# Patient Record
Sex: MALE | Race: WHITE | Employment: FULL TIME | URBAN - METROPOLITAN AREA
[De-identification: names, ages, dates, MRNs, and addresses within clinical notes are randomized per-mention and may not be internally consistent; named-entity substitution may affect disease eponyms.]

---

## 2017-05-23 ENCOUNTER — OFFICE VISIT (OUTPATIENT)
Dept: PEDIATRICS | Facility: CLINIC | Age: 42
End: 2017-05-23
Payer: COMMERCIAL

## 2017-05-23 VITALS
HEIGHT: 71 IN | TEMPERATURE: 98 F | SYSTOLIC BLOOD PRESSURE: 122 MMHG | BODY MASS INDEX: 30.66 KG/M2 | DIASTOLIC BLOOD PRESSURE: 70 MMHG | OXYGEN SATURATION: 96 % | WEIGHT: 219 LBS | HEART RATE: 73 BPM

## 2017-05-23 DIAGNOSIS — G89.29 CHRONIC LEFT SHOULDER PAIN: ICD-10-CM

## 2017-05-23 DIAGNOSIS — Z00.00 ROUTINE GENERAL MEDICAL EXAMINATION AT A HEALTH CARE FACILITY: Primary | ICD-10-CM

## 2017-05-23 DIAGNOSIS — M25.512 CHRONIC LEFT SHOULDER PAIN: ICD-10-CM

## 2017-05-23 PROCEDURE — 36415 COLL VENOUS BLD VENIPUNCTURE: CPT | Performed by: INTERNAL MEDICINE

## 2017-05-23 PROCEDURE — 80053 COMPREHEN METABOLIC PANEL: CPT | Performed by: INTERNAL MEDICINE

## 2017-05-23 PROCEDURE — 99386 PREV VISIT NEW AGE 40-64: CPT | Performed by: INTERNAL MEDICINE

## 2017-05-23 PROCEDURE — 80061 LIPID PANEL: CPT | Performed by: INTERNAL MEDICINE

## 2017-05-23 NOTE — NURSING NOTE
"Chief Complaint   Patient presents with     Physical       Initial /70 (BP Location: Right arm, Cuff Size: Adult Regular)  Pulse 73  Temp 98  F (36.7  C) (Oral)  Ht 5' 11\" (1.803 m)  Wt 219 lb (99.3 kg)  SpO2 96%  BMI 30.54 kg/m2 Estimated body mass index is 30.54 kg/(m^2) as calculated from the following:    Height as of this encounter: 5' 11\" (1.803 m).    Weight as of this encounter: 219 lb (99.3 kg).  Medication Reconciliation: complete   Em De Leon MA    "

## 2017-05-23 NOTE — PATIENT INSTRUCTIONS
1) Physical therapy for the left neck and shoulder area, if this is not helping, we can consider getting imaging of the spine area    2) Labs downstairs today looking at cholesterol, liver and kidney functions    Dmitri Akhtar MD    Preventive Health Recommendations  Male Ages 40 to 49    Yearly exam:             See your health care provider every year in order to  o   Review health changes.   o   Discuss preventive care.    o   Review your medicines if your doctor has prescribed any.    You should be tested each year for STDs (sexually transmitted diseases) if you re at risk.     Have a cholesterol test every 5 years.     Have a colonoscopy (test for colon cancer) if someone in your family has had colon cancer or polyps before age 50.     After age 45, have a diabetes test (fasting glucose). If you are at risk for diabetes, you should have this test every 3 years.      Talk with your health care provider about whether or not a prostate cancer screening test (PSA) is right for you.    Shots: Get a flu shot each year. Get a tetanus shot every 10 years.     Nutrition:    Eat at least 5 servings of fruits and vegetables daily.     Eat whole-grain bread, whole-wheat pasta and brown rice instead of white grains and rice.     Talk to your provider about Calcium and Vitamin D.     Lifestyle    Exercise for at least 150 minutes a week (30 minutes a day, 5 days a week). This will help you control your weight and prevent disease.     Limit alcohol to one drink per day.     No smoking.     Wear sunscreen to prevent skin cancer.     See your dentist every six months for an exam and cleaning.

## 2017-05-23 NOTE — LETTER
[facility]  [address] [place] [address]  [place] [address] [phone]   May 26, 2017    Jp Burch  [address]  [place] [address]      Dear Jp,     Here are the results from the recent Labs that we did.     All of your labs looked great.     Let me know if you have questions or concerns!     Sincerely,       Dmitri Akhtar MD   Internal Medicine and Pediatrics         Results for orders placed or performed in visit on 05/23/17   Lipid panel reflex to direct LDL   Result Value Ref Range    Cholesterol 182 <200 mg/dL    Triglycerides 153 (H) <150 mg/dL    HDL Cholesterol 55 >39 mg/dL    LDL Cholesterol Calculated 96 <100 mg/dL    Non HDL Cholesterol 127 <130 mg/dL   Comprehensive metabolic panel   Result Value Ref Range    Sodium 139 133 - 144 mmol/L    Potassium 4.3 3.4 - 5.3 mmol/L    Chloride 105 94 - 109 mmol/L    Carbon Dioxide 25 20 - 32 mmol/L    Anion Gap 9 3 - 14 mmol/L    Glucose 79 70 - 99 mg/dL    Urea Nitrogen 19 7 - 30 mg/dL    Creatinine 1.10 0.66 - 1.25 mg/dL    GFR Estimate 74 >60 mL/min/1.7m2    GFR Estimate If Black 89 >60 mL/min/1.7m2    Calcium 9.1 8.5 - 10.1 mg/dL    Bilirubin Total 0.5 0.2 - 1.3 mg/dL    Albumin 4.0 3.4 - 5.0 g/dL    Protein Total 7.8 6.8 - 8.8 g/dL    Alkaline Phosphatase 72 40 - 150 U/L    ALT 34 0 - 70 U/L    AST 20 0 - 45 U/L

## 2017-05-23 NOTE — PROGRESS NOTES
SUBJECTIVE:     CC: Jp Burch is an 41 year old male who presents for preventative health visit.     Physical   Annual:     Getting at least 3 servings of Calcium per day::  NO    Bi-annual eye exam::  NO    Dental care twice a year::  NO    Sleep apnea or symptoms of sleep apnea::  None    Diet::  Regular (no restrictions)    Frequency of exercise::  None    Taking medications regularly::  Yes    Medication side effects::  None    Additional concerns today::  YES    Left shoulder: has been having issues for the last 7 year. Gets some pain and numbness with weakness across chest and neck area, occurs daily. Occurs anywhere from 2 minutes to 2 hours.   Did cardiology evaluation and negative before. Injured shoulder at age 18- did not have checked out at that time. Has been times feels that physical reach will make worse.  Had issues with lower           Today's PHQ-2 Score:   PHQ-2 ( 1999 Pfizer) 5/23/2017   Little interest or pleasure in doing things Not at all   Feeling down, depressed or hopeless Not at all   PHQ-2 Score 0       Abuse: Current or Past(Physical, Sexual or Emotional)- No  Do you feel safe in your environment - Yes    Social History   Substance Use Topics     Smoking status: Never Smoker     Smokeless tobacco: Not on file     Alcohol use Yes      Comment: 1 drink per month     The patient does not drink >3 drinks per day nor >7 drinks per week.    Last PSA: No results found for: PSA    No results for input(s): CHOL, HDL, LDL, TRIG, CHOLHDLRATIO, NHDL in the last 97522 hours.    Reviewed orders with patient. Reviewed health maintenance and updated orders accordingly - Yes    Reviewed and updated as needed this visit by clinical staff  Tobacco  Allergies  Med Hx  Surg Hx  Fam Hx  Soc Hx        Reviewed and updated as needed this visit by Provider            ROS:  C: NEGATIVE for fever, chills, change in weight  I: NEGATIVE for worrisome rashes, moles or lesions  E: NEGATIVE for vision  "changes or irritation  ENT: NEGATIVE for ear, mouth and throat problems  R: NEGATIVE for significant cough or SOB  CV: NEGATIVE for chest pain, palpitations or peripheral edema  GI: NEGATIVE for nausea, abdominal pain, heartburn, or change in bowel habits   male: negative for dysuria, hematuria, decreased urinary stream, erectile dysfunction, urethral discharge  M: NEGATIVE for significant arthralgias or myalgia  N: NEGATIVE for weakness, dizziness or paresthesias  P: NEGATIVE for changes in mood or affect    Problem list, Medication list, Allergies, and Medical/Social/Surgical histories reviewed in EPIC and updated as appropriate.  OBJECTIVE:     /70 (BP Location: Right arm, Cuff Size: Adult Regular)  Pulse 73  Temp 98  F (36.7  C) (Oral)  Ht 5' 11\" (1.803 m)  Wt 219 lb (99.3 kg)  SpO2 96%  BMI 30.54 kg/m2  EXAM:  GENERAL: healthy, alert and no distress  EYES: Eyes grossly normal to inspection, PERRL and conjunctivae and sclerae normal  HENT: ear canals and TM's normal, nose and mouth without ulcers or lesions  NECK: no adenopathy, no asymmetry, masses, or scars and thyroid normal to palpation  RESP: lungs clear to auscultation - no rales, rhonchi or wheezes  CV: regular rate and rhythm, normal S1 S2, no S3 or S4, no murmur, click or rub, no peripheral edema and peripheral pulses strong  ABDOMEN: soft, nontender, no hepatosplenomegaly, no masses and bowel sounds normal  MS: no gross musculoskeletal defects noted, no edema  MS: mild tenderness along the left trapezoid area, otherwise normal internal and external rotation and ROM of the left shoulder area  SKIN: no suspicious lesions or rashes  NEURO: Normal strength and tone, mentation intact and speech normal  PSYCH: mentation appears normal, affect normal/bright    ASSESSMENT/PLAN:     1. Routine general medical examination at a health care facility  Discussed routine health screeing  - Lipid panel reflex to direct LDL  - Comprehensive metabolic " "panel    2. Chronic left shoulder pain  Has had cardiology evaluation, seems less likely complex regional pain syndrome, would consider MRI of the cervical thoracic spine if having ongoing issues or worsening symptosm  - [facility] HAND, AND CHIROPRACTIC REFERRAL    COUNSELING:   Reviewed preventive health counseling, as reflected in patient instructions         reports that he has never smoked. He does not have any smokeless tobacco history on file.    Estimated body mass index is 28.73 kg/(m^2) as calculated from the following:    Height as of 8/3/09: 5' 11\" (1.803 m).    Weight as of 8/3/09: 206 lb (93.4 kg).   Weight management plan: Discussed healthy diet and exercise guidelines and patient will follow up in 12 months in clinic to re-evaluate.    Counseling Resources:  ATP IV Guidelines  Pooled Cohorts Equation Calculator  FRAX Risk Assessment  ICSI Preventive Guidelines  Dietary Guidelines for Americans, 2010  USDA's MyPlate  ASA Prophylaxis  Lung CA Screening    Dmitri Akhtar MD, MD  [facility] [place]  "

## 2017-05-23 NOTE — MR AVS SNAPSHOT
After Visit Summary   5/23/2017    Jp Burch    MRN: [id number]           Patient Information     Date Of Birth          1975        Visit Information        Provider Department      5/23/2017 4:00 PM Dmitri Akhtar MD [facility]        Today's Diagnoses     Routine general medical examination at a health care facility    -  1    Chronic left shoulder pain          Care Instructions    1) Physical therapy for the left neck and shoulder area, if this is not helping, we can consider getting imaging of the spine area    2) Labs downstairs today looking at cholesterol, liver and kidney functions    Dmitri Akhtar MD    Preventive Health Recommendations  Male Ages 40 to 49    Yearly exam:             See your health care provider every year in order to  o   Review health changes.   o   Discuss preventive care.    o   Review your medicines if your doctor has prescribed any.    You should be tested each year for STDs (sexually transmitted diseases) if you re at risk.     Have a cholesterol test every 5 years.     Have a colonoscopy (test for colon cancer) if someone in your family has had colon cancer or polyps before age 50.     After age 45, have a diabetes test (fasting glucose). If you are at risk for diabetes, you should have this test every 3 years.      Talk with your health care provider about whether or not a prostate cancer screening test (PSA) is right for you.    Shots: Get a flu shot each year. Get a tetanus shot every 10 years.     Nutrition:    Eat at least 5 servings of fruits and vegetables daily.     Eat whole-grain bread, whole-wheat pasta and brown rice instead of white grains and rice.     Talk to your provider about Calcium and Vitamin D.     Lifestyle    Exercise for at least 150 minutes a week (30 minutes a day, 5 days a week). This will help you control your weight and prevent disease.     Limit alcohol to one drink per day.     No smoking.     Wear sunscreen to  prevent skin cancer.     See your dentist every six months for an exam and cleaning.            Follow-ups after your visit        Additional Services     [facility] HAND, AND CH[facility]RACTIC REFERRAL       **This order will print in the [facility] Scheduling Office**    Physical Therapy, Hand Therapy and Chiropractic Care are available through:    *[facility]  *[facility]  *[facility]    Call one number to schedule at any of the above locations: [phone].    Your provider has referred you to: [facility] - PT and/or Chiropractic Care determined by clinical presentation at [facility] or [facility] Initial Visit    Indication/Reason for Referral: neck pain with radiation down the left arm, has been present for 7 years  Onset of Illness: 7 years  Therapy Orders: Evaluate and Treat  Special Programs: None  Special Request: None      Brian Torres      Additional Comments for the Therapist or Chiropractor:     Please be aware that coverage of these services is subject to the terms and limitations of your health insurance plan.  Call member services at your health plan with any benefit or coverage questions.      Please bring the following to your appointment:    *Your personal calendar for scheduling future appointments  *Comfortable clothing                  Who to contact     If you have questions or need follow up information about today's clinic visit or your schedule please contact [facility] directly at [phone].  Normal or non-critical lab and imaging results will be communicated to you by MyChart, letter or phone within 4 business days after the clinic has received the results. If you do not hear from us within 7 days, please contact the clinic through MyChart or phone. If you have a critical or abnormal lab result, we will notify you by phone as soon as possible.  Submit refill requests through [employer] or call your pharmacy and they will forward  "the refill request to us. Please allow 3 business days for your refill to be completed.          Additional Information About Your Visit        MyChart Information     [employer] lets you send messages to your doctor, view your test results, renew your prescriptions, schedule appointments and more. To sign up, go to [email or web address] . Click on \"Log in\" on the left side of the screen, which will take you to the Welcome page. Then click on \"Sign up Now\" on the right side of the page.     You will be asked to enter the access code listed below, as well as some personal information. Please follow the directions to create your username and password.     Your access code is: [email or web address]  Expires: 8/15/2017 11:32 AM     Your access code will  in 90 days. If you need help or a new code, please call your [place] clinic or [phone].        Care EveryWhere ID     This is your Care EveryWhere ID. This could be used by other organizations to access your [place] medical records  [id number]        Your Vitals Were     Pulse Temperature Height Pulse Oximetry BMI (Body Mass Index)       73 98  F (36.7  C) (Oral) 5' 11\" (1.803 m) 96% 30.54 kg/m2        Blood Pressure from Last 3 Encounters:   17 122/70   09 135/75   09 158/70    Weight from Last 3 Encounters:   17 219 lb (99.3 kg)   09 206 lb (93.4 kg)   09 225 lb (102.1 kg)              We Performed the Following     Comprehensive metabolic panel     [facility] HAND, AND CHIROPRACTIC REFERRAL     Lipid panel reflex to direct LDL        Primary Care Provider Office Phone # Fax #    Hillary Markham [phone]       [facility] [address] [place] [address]        Thank you!     Thank you for choosing [facility]  for your care. Our goal is always to provide you with excellent care. Hearing back from our patients is one way we can continue to improve our services. Please take " a few minutes to complete the written survey that you may receive in the mail after your visit with us. Thank you!             Your Updated Medication List - Protect others around you: Learn how to safely use, store and throw away your medicines at www.disposemymeds.org.      Notice  As of 5/23/2017  4:26 PM    You have not been prescribed any medications.

## 2017-05-25 LAB
ALBUMIN SERPL-MCNC: 4 G/DL (ref 3.4–5)
ALP SERPL-CCNC: 72 U/L (ref 40–150)
ALT SERPL W P-5'-P-CCNC: 34 U/L (ref 0–70)
ANION GAP SERPL CALCULATED.3IONS-SCNC: 9 MMOL/L (ref 3–14)
AST SERPL W P-5'-P-CCNC: 20 U/L (ref 0–45)
BILIRUB SERPL-MCNC: 0.5 MG/DL (ref 0.2–1.3)
BUN SERPL-MCNC: 19 MG/DL (ref 7–30)
CALCIUM SERPL-MCNC: 9.1 MG/DL (ref 8.5–10.1)
CHLORIDE SERPL-SCNC: 105 MMOL/L (ref 94–109)
CHOLEST SERPL-MCNC: 182 MG/DL
CO2 SERPL-SCNC: 25 MMOL/L (ref 20–32)
CREAT SERPL-MCNC: 1.1 MG/DL (ref 0.66–1.25)
GFR SERPL CREATININE-BSD FRML MDRD: 74 ML/MIN/1.7M2
GLUCOSE SERPL-MCNC: 79 MG/DL (ref 70–99)
HDLC SERPL-MCNC: 55 MG/DL
LDLC SERPL CALC-MCNC: 96 MG/DL
NONHDLC SERPL-MCNC: 127 MG/DL
POTASSIUM SERPL-SCNC: 4.3 MMOL/L (ref 3.4–5.3)
PROT SERPL-MCNC: 7.8 G/DL (ref 6.8–8.8)
SODIUM SERPL-SCNC: 139 MMOL/L (ref 133–144)
TRIGL SERPL-MCNC: 153 MG/DL

## 2018-10-26 ENCOUNTER — OFFICE VISIT (OUTPATIENT)
Dept: URGENT CARE | Facility: URGENT CARE | Age: 43
End: 2018-10-26
Payer: COMMERCIAL

## 2018-10-26 VITALS
OXYGEN SATURATION: 98 % | WEIGHT: 203.4 LBS | DIASTOLIC BLOOD PRESSURE: 76 MMHG | SYSTOLIC BLOOD PRESSURE: 102 MMHG | BODY MASS INDEX: 28.37 KG/M2 | HEART RATE: 79 BPM | TEMPERATURE: 98.5 F

## 2018-10-26 DIAGNOSIS — H00.032 CELLULITIS OF RIGHT LOWER EYELID: Primary | ICD-10-CM

## 2018-10-26 PROCEDURE — 99213 OFFICE O/P EST LOW 20 MIN: CPT | Performed by: FAMILY MEDICINE

## 2018-10-26 RX ORDER — CEPHALEXIN 500 MG/1
500 CAPSULE ORAL 3 TIMES DAILY
Qty: 21 CAPSULE | Refills: 0 | Status: SHIPPED | OUTPATIENT
Start: 2018-10-26 | End: 2018-11-02

## 2018-10-26 NOTE — NURSING NOTE
VISION   No corrective lenses  Tool used: Aidan   Right eye:        10/10 (20/20)  Left eye:          10/12.5 (20/25)  Fartun Florez MA

## 2018-10-26 NOTE — MR AVS SNAPSHOT
"              After Visit Summary   10/26/2018    Jp Burch    MRN: [id number]           Patient Information     Date Of Birth          1975        Visit Information        Provider Department      10/26/2018 6:20 PM Jay Jay Rocha MD [facility]        Today's Diagnoses     Cellulitis of right lower eyelid    -  1      Care Instructions    Place warmth onto the swollen, tender right lower eyelid for 15 minutes at a time, every 2-3 hours while awake.      follow up with a primary care provider if not better in 7-10 days.  Sooner if the redness keeps spreading despite two days of the antibiotic therapy or if fevers appear.                Follow-ups after your visit        Who to contact     If you have questions or need follow up information about today's clinic visit or your schedule please contact [facility] directly at [phone].  Normal or non-critical lab and imaging results will be communicated to you by [employer], letter or phone within 4 business days after the clinic has received the results. If you do not hear from us within 7 days, please contact the clinic through [employer] or phone. If you have a critical or abnormal lab result, we will notify you by phone as soon as possible.  Submit refill requests through [employer] or call your pharmacy and they will forward the refill request to us. Please allow 3 business days for your refill to be completed.          Additional Information About Your Visit        MyChart Information     [employer] lets you send messages to your doctor, view your test results, renew your prescriptions, schedule appointments and more. To sign up, go to [email or web address] . Click on \"Log in\" on the left side of the screen, which will take you to the Welcome page. Then click on \"Sign up Now\" on the right side of the page.     You will be asked to enter the access code listed below, as well as some personal information. Please follow the directions " to create your username and password.     Your access code is: [email or web address]  Expires: 2019  6:45 PM     Your access code will  in 90 days. If you need help or a new code, please call your [place] clinic or [phone].        Care EveryWhere ID     This is your Care EveryWhere ID. This could be used by other organizations to access your [place] medical records  [id number]        Your Vitals Were     Pulse Temperature Pulse Oximetry BMI (Body Mass Index)          79 98.5  F (36.9  C) (Oral) 98% 28.37 kg/m2         Blood Pressure from Last 3 Encounters:   10/26/18 102/76   17 122/70   09 135/75    Weight from Last 3 Encounters:   10/26/18 203 lb 6.4 oz (92.3 kg)   17 219 lb (99.3 kg)   09 206 lb (93.4 kg)              Today, you had the following     No orders found for display         Today's Medication Changes          These changes are accurate as of 10/26/18  6:45 PM.  If you have any questions, ask your nurse or doctor.               Start taking these medicines.        Dose/Directions    cephALEXin 500 MG capsule   Commonly known as:  KEFLEX   Used for:  Cellulitis of right lower eyelid   Started by:  Jay [place] Rocha MD        Dose:  500 mg   Take 1 capsule (500 mg) by mouth 3 times daily for 7 days   Quantity:  21 capsule   Refills:  0            Where to get your medicines      Some of these will need a paper prescription and others can be bought over the counter.  Ask your nurse if you have questions.     Bring a paper prescription for each of these medications     cephALEXin 500 MG capsule                Primary Care Provider Office Phone # Fax #    Dmitri Akhtar [phone] [address] [place] [address]        Equal Access to Services     [facility] : Derrick Plascencia, izzy cross, qaybta kapankaj dunlap. So [facility] [place] [phone].    ATENCIÓN: hilton Roach  berkowitz disposición servicios gratuitos de asistencia lingüística. Elba murrell [phone].    We comply with applicable federal civil rights laws and [place] laws. We do not discriminate on the basis of race, color, national origin, age, disability, sex, sexual orientation, or gender identity.            Thank you!     Thank you for choosing [facility]  for your care. Our goal is always to provide you with excellent care. Hearing back from our patients is one way we can continue to improve our services. Please take a few minutes to complete the written survey that you may receive in the mail after your visit with us. Thank you!             Your Updated Medication List - Protect others around you: Learn how to safely use, store and throw away your medicines at www.disposemymeds.org.          This list is accurate as of 10/26/18  6:45 PM.  Always use your most recent med list.                   Brand Name Dispense Instructions for use Diagnosis    cephALEXin 500 MG capsule    KEFLEX    21 capsule    Take 1 capsule (500 mg) by mouth 3 times daily for 7 days    Cellulitis of right lower eyelid       IBUPROFEN PO

## 2018-10-26 NOTE — PROGRESS NOTES
SUBJECTIVE:  Chief Complaint:   Chief Complaint   Patient presents with     Urgent Care     Eye Problem     start today at 1pm sx right eye, some swelling, rubbing eyes, no discomfort or pain, sensitive, no vision changes, hx of sinus infection in last few days, hx of lasik surgery 15 years ago  tx none       History of Present Illness:  Jp Burch is a 43 year old male who presents complaining of worsening pain and swelling at the right lower eyelid margin since 1 pm today..   No pus/discharge/bleeding.    Contact wearer :  None.     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    OBJECTIVE:  Visual Acuity:  right eye 20/20; left eye 20/25  /76 (BP Location: Right arm, Patient Position: Chair, Cuff Size: Adult Large)  Pulse 79  Temp 98.5  F (36.9  C) (Oral)  Wt 203 lb 6.4 oz (92.3 kg)  SpO2 98%  BMI 28.37 kg/m2  General: no acute distress  Eye exam: Right eye abnormalities:  The lower eyelid has increased edema, confluent erythema and tenderness.  No obvious stye.  The conjunctiva was within normal limits.  No discharge.  extraocular movements intact.     ASSESSMENT:  Cellulitis of the right lower eyelid.      PLAN:  Rx:  Cephalexin See orders in epic  Warm  follow up with the primary care provider if not better in 7-10 days. Sooner if redness keeps spreading despite two days of antibiotic therapy or if fevers appear.     Jay [place] Rocha MD

## 2018-10-26 NOTE — PATIENT INSTRUCTIONS
Place warmth onto the swollen, tender right lower eyelid for 15 minutes at a time, every 2-3 hours while awake.      follow up with a primary care provider if not better in 7-10 days.  Sooner if the redness keeps spreading despite two days of the antibiotic therapy or if fevers appear.

## 2019-05-08 ENCOUNTER — COMMUNICATION - HEALTHEAST (OUTPATIENT)
Dept: SCHEDULING | Facility: CLINIC | Age: 44
End: 2019-05-08

## 2019-05-09 ENCOUNTER — OFFICE VISIT (OUTPATIENT)
Dept: URGENT CARE | Facility: URGENT CARE | Age: 44
End: 2019-05-09
Payer: COMMERCIAL

## 2019-05-09 VITALS
HEART RATE: 71 BPM | WEIGHT: 221 LBS | TEMPERATURE: 98.2 F | BODY MASS INDEX: 30.82 KG/M2 | OXYGEN SATURATION: 97 % | SYSTOLIC BLOOD PRESSURE: 122 MMHG | DIASTOLIC BLOOD PRESSURE: 80 MMHG

## 2019-05-09 DIAGNOSIS — H92.01 DISCOMFORT OF RIGHT EAR: Primary | ICD-10-CM

## 2019-05-09 PROCEDURE — 99213 OFFICE O/P EST LOW 20 MIN: CPT | Performed by: FAMILY MEDICINE

## 2019-05-09 NOTE — PROGRESS NOTES
Subjective:   Jp Burch is a 43 year old male who presents for   Chief Complaint   Patient presents with     Urgent Care     Ear Problem     right ear pain since yesterday- root canal today today right upper side        Apparently had crown on right side placed 2 months ago of the upper molar - concerns about decay being too much. Patient had root canal today around 1045 - pain really came on around 4pm. Has not called the endodontist. Tooth pain no longer there  Ibuprofen not helping with discomfort - he is taking about 400mg every 6 hours.   R ear is discomforting. No ear ringing. No ear discharge. Feels a pressure/discomfort. No facial swelling.   Denies fevers.        Patient Active Problem List    Diagnosis Date Noted     CARDIOVASCULAR SCREENING; LDL GOAL LESS THAN 160 10/31/2010     Priority: Medium     Vitamin D deficiency      Priority: Medium     vitD2/D3 of 23       Generalized anxiety disorder 05/08/2009     Priority: Medium     Diagnosis updated by automated process. Provider to review and confirm.       Carpal tunnel syndrome 02/27/2008     Priority: Medium     Esophageal reflux 02/27/2008     Priority: Medium     Herpes simplex virus (HSV) infection 02/13/2007     Priority: Medium     Since childhood    Problem list name updated by automated process. Provider to review       Obesity 10/03/2006     Priority: Medium     Problem list name updated by automated process. Provider to review         Current Outpatient Medications   Medication     IBUPROFEN PO     No current facility-administered medications for this visit.      ROS:  As above per HPI     Objective:   /80 (BP Location: Right arm)   Pulse 71   Temp 98.2  F (36.8  C) (Tympanic)   Wt 100.2 kg (221 lb)   SpO2 97%   BMI 30.82 kg/m  , Body mass index is 30.82 kg/m .  Gen:  NAD, well-nourished, sitting in chair comfortably  HEENT: EOMI, sclera anicteric, Head normocephalic, ; nares patent; moist mucous membranes, normal left and  right TM. No pain with pulling on outer ears. Normal ear canals. No gum swelling of right upper jaw, uvula midline, no trismus  Neck: trachea midline, no thyromegaly  CV:  Hemodynamically stable  Pulm:  no increased work of breathing   Extrem: no cyanosis, edema or clubbing  Skin: no obvious rashes or abnormalities  Psych: Euthymic, linear thoughts, normal rate of speech      Assessment & Plan:   Jp ROSAURA Burch, 43 year old male who presents with:  Discomfort of right ear  Patient wanted reassurance there was no right ear infection. Appears normal on exam without bulging or retraction. No perforation. Normal ear canal. Tylenol and ibuprofen recommended for pain.       Mychal Cunningham MD   [facility]    The use of Dragon/Wonderflow dictation services may have been used to construct the content in this note; any grammatical or spelling errors are non-intentional. Please contact the author of this note directly if you are in need of any clarification.

## 2019-05-09 NOTE — PATIENT INSTRUCTIONS
Tylenol 650mg every 4-6 hours    Ibuprofen 600-800mg every 6 hours    Heat or cold packs, whichever feels better      If the pain gets worse please call us or your dentist's office      If you develop fever please call immediately

## 2019-10-10 ENCOUNTER — OFFICE VISIT (OUTPATIENT)
Dept: OPTOMETRY | Facility: CLINIC | Age: 44
End: 2019-10-10
Payer: COMMERCIAL

## 2019-10-10 DIAGNOSIS — H52.4 PRESBYOPIA: ICD-10-CM

## 2019-10-10 DIAGNOSIS — H52.13 MYOPIA OF BOTH EYES: Primary | ICD-10-CM

## 2019-10-10 PROCEDURE — 92004 COMPRE OPH EXAM NEW PT 1/>: CPT | Performed by: OPTOMETRIST

## 2019-10-10 ASSESSMENT — CUP TO DISC RATIO
OD_RATIO: 0.25
OS_RATIO: 0.35

## 2019-10-10 ASSESSMENT — REFRACTION_MANIFEST
OD_AXIS: 006
OS_CYLINDER: +0.25
METHOD_AUTOREFRACTION: 1
OD_SPHERE: -0.75
OS_SPHERE: -1.00
OD_SPHERE: -1.00
OD_CYLINDER: SPHERE
OS_CYLINDER: SPHERE
OD_CYLINDER: +0.50
OS_AXIS: 161
OS_SPHERE: -1.25

## 2019-10-10 ASSESSMENT — VISUAL ACUITY
OD_SC+: -2
OD_SC: 20/30+3
OS_SC: 20/20-2
OS_SC+: -1
METHOD: SNELLEN - LINEAR
OD_SC: 20/25
OS_SC: 20/30

## 2019-10-10 ASSESSMENT — TONOMETRY
OS_IOP_MMHG: 15
OD_IOP_MMHG: 15
IOP_METHOD: APPLANATION

## 2019-10-10 ASSESSMENT — EXTERNAL EXAM - LEFT EYE: OS_EXAM: NORMAL

## 2019-10-10 ASSESSMENT — SLIT LAMP EXAM - LIDS
COMMENTS: NORMAL
COMMENTS: NORMAL

## 2019-10-10 ASSESSMENT — CONF VISUAL FIELD
OD_NORMAL: 1
OS_NORMAL: 1
METHOD: COUNTING FINGERS

## 2019-10-10 ASSESSMENT — EXTERNAL EXAM - RIGHT EYE: OD_EXAM: NORMAL

## 2019-10-10 NOTE — PROGRESS NOTES
Chief Complaint   Patient presents with     Annual Eye Exam         Last Eye Exam: 2002, LASIK Dr Braswell  Dilated Previously: Yes    What are you currently using to see?  does not use glasses or contacts       Distance Vision Acuity: Noticed gradual change in both eyes and Noticed sudden change in both eyes    Near Vision Acuity: Satisfied with vision while reading and using computer unaided    Eye Comfort: good  Do you use eye drops? : No  Occupation or Hobbies:  - computers a lot.     Vivian Chance CPO          Medical, surgical and family histories reviewed and updated 10/10/2019.       OBJECTIVE: See Ophthalmology exam    ASSESSMENT:    ICD-10-CM    1. Myopia of both eyes H52.13    2. Presbyopia H52.4       PLAN:   Distance only prescription   Yaritza Singer OD

## 2019-10-10 NOTE — LETTER
10/10/2019         RE: Jp Burch  [address] [id number]        Dear Colleague,    Thank you for referring your patient, Jp Burch, to the [facility]. Please see a copy of my visit note below.    Chief Complaint   Patient presents with     Annual Eye Exam         Last Eye Exam: 2002, LASIK Dr Braswell  Dilated Previously: Yes    What are you currently using to see?  does not use glasses or contacts       Distance Vision Acuity: Noticed gradual change in both eyes and Noticed sudden change in both eyes    Near Vision Acuity: Satisfied with vision while reading and using computer unaided    Eye Comfort: good  Do you use eye drops? : No  Occupation or Hobbies:  - computers a lot.     Vivian Chance CPO          Medical, surgical and family histories reviewed and updated 10/10/2019.       OBJECTIVE: See Ophthalmology exam    ASSESSMENT:    ICD-10-CM    1. Myopia of both eyes H52.13    2. Presbyopia H52.4       PLAN:   Distance only prescription   Yaritza Singer OD     Again, thank you for allowing me to participate in the care of your patient.        Sincerely,        Yaritza Singer, OD

## 2019-11-15 ENCOUNTER — OFFICE VISIT (OUTPATIENT)
Dept: URGENT CARE | Facility: URGENT CARE | Age: 44
End: 2019-11-15
Payer: COMMERCIAL

## 2019-11-15 VITALS
DIASTOLIC BLOOD PRESSURE: 64 MMHG | TEMPERATURE: 98.3 F | WEIGHT: 221.5 LBS | OXYGEN SATURATION: 97 % | HEIGHT: 71 IN | HEART RATE: 73 BPM | SYSTOLIC BLOOD PRESSURE: 118 MMHG | BODY MASS INDEX: 31.01 KG/M2

## 2019-11-15 DIAGNOSIS — H10.31 ACUTE CONJUNCTIVITIS OF RIGHT EYE, UNSPECIFIED ACUTE CONJUNCTIVITIS TYPE: ICD-10-CM

## 2019-11-15 DIAGNOSIS — H00.022 HORDEOLUM INTERNUM OF RIGHT LOWER EYELID: Primary | ICD-10-CM

## 2019-11-15 PROCEDURE — 99213 OFFICE O/P EST LOW 20 MIN: CPT | Performed by: PHYSICIAN ASSISTANT

## 2019-11-15 RX ORDER — POLYMYXIN B SULFATE AND TRIMETHOPRIM 1; 10000 MG/ML; [USP'U]/ML
1-2 SOLUTION OPHTHALMIC EVERY 4 HOURS
Qty: 10 ML | Refills: 0 | Status: SHIPPED | OUTPATIENT
Start: 2019-11-15 | End: 2019-12-03

## 2019-11-15 ASSESSMENT — PAIN SCALES - GENERAL: PAINLEVEL: MILD PAIN (2)

## 2019-11-15 ASSESSMENT — MIFFLIN-ST. JEOR: SCORE: 1916.85

## 2019-11-15 NOTE — PATIENT INSTRUCTIONS
Patient Education     Sty (or Stye)  A sty is an infection of the oil gland of the eyelid. It may develop into a small pocket of pus (an abscess). This can cause pain, redness, and swelling. In early stages, a sty is treated with antibiotic cream, eye drops, or a small towel soaked in warm water (a warm compress). More severe cases may need to be opened and drained by a healthcare provider.  Home care    Eye drops or ointment are usually prescribed to treat the infection. Use these as directed.     Artificial tears may also be used to lubricate the eye and make it more comfortable. You can buy these over the counter without a prescription. Talk with your healthcare provider before using any over-the-counter treatment for a sty.    Apply a warm, damp towel to the affected eye for at least 5 minutes, 3 to 4 times a day for a week. Warm compresses open the pores and speed the healing. But if the compresses are too hot, they may burn your eyelid.    Sometimes the sty will drain with this treatment alone. If this happens, keep using the antibiotic until all the redness and swelling are gone.    Wash your hands before and after touching the infected eyelid to avoid spreading the infection.    Don t squeeze or try to break open the sty.  Follow-up care  Follow up with your healthcare provider, or as advised.   When to seek medical advice  Call your healthcare provider right away if any of these occur:    Increase in swelling or redness around the eyelid after 48 to 72 hours    Increase in eye pain or the eyelid blisters    Increase in warmth--the eyelid feels hot    Drainage of blood or thick pus from the sty    Blister on the eyelid    Inability to open the eyelid due to swelling    Fever of 100.4 F (38 C) or above, or as directed by your provider    Vision changes    Headache or stiff neck    The sty comes back  Date Last Reviewed: 8/1/2017 2000-2018 [employer]. [address] [facility], [place]  [address]. All rights reserved. This information is not intended as a substitute for professional medical care. Always follow your healthcare professional's instructions.

## 2019-11-15 NOTE — PROGRESS NOTES
"SUBJECTIVE:  Chief Complaint:   Chief Complaint   Patient presents with     Urgent Care     Eye Problem     pressure feeling in right eye, soreness, crusty gunk x 2 days tx: warm compress     History of Present Illness:  Jp Burch is a 44 year old male who presents complaining of moderate right eye eyelid redness, swelling and lump noted for 2 day(s).   Onset/timing: sudden.    Associated Signs and Symptoms: none  Treatment measures tried include: warm packs  Contact wearer : No  Patient denies eye pain, change of vision, Foreign body sensation, photophobia, periorbital erythema or headache.     Past Medical History:   Diagnosis Date     Carpal tunnel syndrome 2/27/2008     Essential Tremor      HERPES SIMPLEX ORAL 2/13/2007    since childhood     Vitamin D deficiency 2008     vitD2/D3 of 23     No current outpatient medications on file.        ROS:  Review of systems negative except as stated above.    OBJECTIVE:  /64   Pulse 73   Temp 98.3  F (36.8  C) (Tympanic)   Ht 1.803 m (5' 11\")   Wt 100.5 kg (221 lb 8 oz)   SpO2 97%   BMI 30.89 kg/m    Physical Exam  Vitals signs reviewed.   Constitutional:       General: He is not in acute distress.     Appearance: He is well-developed. He is not diaphoretic.   HENT:      Head: Normocephalic.      Right Ear: External ear normal. Tympanic membrane is not erythematous or bulging.      Left Ear: External ear normal. Tympanic membrane is not erythematous or bulging.      Nose: Nose normal. No rhinorrhea.      Mouth/Throat:      Pharynx: No oropharyngeal exudate or posterior oropharyngeal erythema.   Eyes:      General:         Right eye: Hordeolum present.      Extraocular Movements:      Right eye: Normal extraocular motion and no nystagmus.      Conjunctiva/sclera:      Right eye: Right conjunctiva is injected.     Neck:      Musculoskeletal: Normal range of motion.   Cardiovascular:      Rate and Rhythm: Normal rate and regular rhythm.   Pulmonary:      " Effort: Pulmonary effort is normal. No respiratory distress.      Breath sounds: Normal breath sounds. No wheezing or rales.   Chest:      Chest wall: No tenderness.   Lymphadenopathy:      Cervical: No cervical adenopathy.   Skin:     General: Skin is warm and dry.   Neurological:      Mental Status: He is alert.         ASSESSMENT / PLAN:  1. Hordeolum internum of right lower eyelid  Frequent warm soaks, use antibiotic ophthalmic drops as prescribed, and follow up if symptoms persist or worsen. It may take several days for this to resolve. Rarely, these persist or enlarge and in that event, he will need to see an Ophthalmologist. Additionally, he has conjunctival injection and discharge which appears to be conjunctivitis. Will treat with polytrim. Patient agrees with the medical treatment plan.    - trimethoprim-polymyxin b (POLYTRIM) 99667-8.1 UNIT/ML-% ophthalmic solution; Place 1-2 drops into the right eye every 4 hours for 5 days  Dispense: 10 mL; Refill: 0    2. Acute conjunctivitis of right eye, unspecified acute conjunctivitis type      Diagnosis and treatment plan was reviewed with patient and/or family.   We went over any labs or imaging. Discussed worsening symptoms or little to no relief despite treatment plan to follow-up with PCP or return to clinic.  Patient verbalizes understanding. All questions were addressed and answered.   Shirley Cifuentes PA-C

## 2019-12-03 ENCOUNTER — OFFICE VISIT (OUTPATIENT)
Dept: INTERNAL MEDICINE | Facility: CLINIC | Age: 44
End: 2019-12-03
Payer: COMMERCIAL

## 2019-12-03 VITALS
TEMPERATURE: 98.1 F | HEART RATE: 80 BPM | BODY MASS INDEX: 30.76 KG/M2 | OXYGEN SATURATION: 97 % | DIASTOLIC BLOOD PRESSURE: 72 MMHG | SYSTOLIC BLOOD PRESSURE: 104 MMHG | RESPIRATION RATE: 18 BRPM | HEIGHT: 71 IN | WEIGHT: 219.7 LBS

## 2019-12-03 DIAGNOSIS — Z00.00 ROUTINE GENERAL MEDICAL EXAMINATION AT A HEALTH CARE FACILITY: Primary | ICD-10-CM

## 2019-12-03 DIAGNOSIS — L82.1 SEBORRHEIC KERATOSES: ICD-10-CM

## 2019-12-03 LAB
ERYTHROCYTE [DISTWIDTH] IN BLOOD BY AUTOMATED COUNT: 13.9 % (ref 10–15)
HCT VFR BLD AUTO: 45.8 % (ref 40–53)
HGB BLD-MCNC: 15.2 G/DL (ref 13.3–17.7)
MCH RBC QN AUTO: 29 PG (ref 26.5–33)
MCHC RBC AUTO-ENTMCNC: 33.2 G/DL (ref 31.5–36.5)
MCV RBC AUTO: 87 FL (ref 78–100)
PLATELET # BLD AUTO: 250 10E9/L (ref 150–450)
RBC # BLD AUTO: 5.25 10E12/L (ref 4.4–5.9)
WBC # BLD AUTO: 7.1 10E9/L (ref 4–11)

## 2019-12-03 PROCEDURE — G0103 PSA SCREENING: HCPCS | Performed by: INTERNAL MEDICINE

## 2019-12-03 PROCEDURE — 85027 COMPLETE CBC AUTOMATED: CPT | Performed by: INTERNAL MEDICINE

## 2019-12-03 PROCEDURE — 80061 LIPID PANEL: CPT | Performed by: INTERNAL MEDICINE

## 2019-12-03 PROCEDURE — 84443 ASSAY THYROID STIM HORMONE: CPT | Performed by: INTERNAL MEDICINE

## 2019-12-03 PROCEDURE — 99396 PREV VISIT EST AGE 40-64: CPT | Performed by: INTERNAL MEDICINE

## 2019-12-03 PROCEDURE — 80053 COMPREHEN METABOLIC PANEL: CPT | Performed by: INTERNAL MEDICINE

## 2019-12-03 PROCEDURE — 36415 COLL VENOUS BLD VENIPUNCTURE: CPT | Performed by: INTERNAL MEDICINE

## 2019-12-03 SDOH — HEALTH STABILITY: MENTAL HEALTH: HOW OFTEN DO YOU HAVE A DRINK CONTAINING ALCOHOL?: 2-4 TIMES A MONTH

## 2019-12-03 ASSESSMENT — MIFFLIN-ST. JEOR: SCORE: 1908.68

## 2019-12-03 NOTE — PROGRESS NOTES
3  SUBJECTIVE:   CC: Jp Burch is an 44 year old male who presents for preventive health visit.     *Has a few moles he would like looked at, has a new one on his face, some on his back.   *Has noticed that he has more frequent episodes where he feels like his blood sugar is really low? Will get shaky, weak, just doesn't feel right, will have to sit down. Usually happens if he has gone a while without eating and is being active, but he never used to be this sensitive to those effects.      Healthy Habits:    Do you get at least three servings of calcium containing foods daily (dairy, green leafy vegetables, etc.)? no, taking calcium and/or vitamin D supplement: no. Has spinach almost every day    Amount of exercise or daily activities, outside of work: 1 day(s) per week    Problems taking medications regularly not applicable    Medication side effects: No    Have you had an eye exam in the past two years? yes    Do you see a dentist twice per year? yes    Do you have sleep apnea, excessive snoring or daytime drowsiness?no      PROBLEMS TO ADD ON...  Concern for skin moles, on the face and back. No pain, no irritation.     Today's PHQ-2 Score:   PHQ-2 ( 1999 [employer]) 12/3/2019 5/23/2017   Q1: Little interest or pleasure in doing things 0 -   Q2: Feeling down, depressed or hopeless 0 -   PHQ-2 Score 0 -   Q1: Little interest or pleasure in doing things - Not at all   Q2: Feeling down, depressed or hopeless - Not at all   PHQ-2 Score - 0       Abuse: Current or Past(Physical, Sexual or Emotional)- No  Do you feel safe in your environment? Yes        Social History     Tobacco Use     Smoking status: Never Smoker     Smokeless tobacco: Never Used   Substance Use Topics     Alcohol use: Yes     Comment: 1 drink per month     If you drink alcohol do you typically have >3 drinks per day or >7 drinks per week? No                      Last PSA: No results found for: PSA    Reviewed orders with patient. Reviewed health  maintenance and updated orders accordingly - Yes  Lab work is in process  Labs reviewed in EPIC    Reviewed and updated as needed this visit by clinical staff  Tobacco  Allergies  Meds  Med Hx  Surg Hx  Fam Hx  Soc Hx        Reviewed and updated as needed this visit by Provider            ROS:  CONSTITUTIONAL: NEGATIVE for fever, chills, change in weight  INTEGUMENTARY/SKIN: NEGATIVE for worrisome rashes, moles or lesions  EYES: NEGATIVE for vision changes or irritation  ENT: NEGATIVE for ear, mouth and throat problems  RESP: NEGATIVE for significant cough or SOB  CV: NEGATIVE for chest pain, palpitations or peripheral edema  GI: NEGATIVE for nausea, abdominal pain, heartburn, or change in bowel habits   male: negative for dysuria, hematuria, decreased urinary stream, erectile dysfunction, urethral discharge  MUSCULOSKELETAL: NEGATIVE for significant arthralgias or myalgia  NEURO: NEGATIVE for weakness, dizziness or paresthesias  PSYCHIATRIC: NEGATIVE for changes in mood or affect    OBJECTIVE:   There were no vitals taken for this visit.  EXAM:  GENERAL:overweight, alert and no distress  EYES: Eyes grossly normal to inspection, PERRL and conjunctivae and sclerae normal  HENT: ear canals and TM's normal, nose and mouth without ulcers or lesions  NECK: no adenopathy, no asymmetry, masses, or scars and thyroid normal to palpation  RESP: lungs clear to auscultation - no rales, rhonchi or wheezes  CV: regular rate and rhythm, normal S1 S2, no S3 or S4, no murmur, click or rub, no peripheral edema and peripheral pulses strong  ABDOMEN: soft, nontender, no hepatosplenomegaly, no masses and bowel sounds normal   (male): normal male genitalia without lesions or urethral discharge, no hernia  MS: no gross musculoskeletal defects noted, no edema  SKIN: no suspicious lesions or rashes, SK lesion on the right lateral cheek , mandible area, skin tags on the back   NEURO: Normal strength and tone, mentation intact and  "speech normal  PSYCH: mentation appears normal, affect normal/bright    Diagnostic Test Results:  Labs reviewed in Epic    ASSESSMENT/PLAN:       ICD-10-CM    1. Routine general medical examination at a health care facility Z00.00 CBC with platelets     Comprehensive metabolic panel     TSH with free T4 reflex     Prostate spec antigen screen     Lipid panel reflex to direct LDL Non-fasting     CANCELED: Lipid panel reflex to direct LDL Fasting   2. Seborrheic keratoses L82.1      Assess lab work   Monitor skin lesions, benign appearance     COUNSELING:  Reviewed preventive health counseling, as reflected in patient instructions       Regular exercise       Healthy diet/nutrition       Vision screening       Hearing screening       Colon cancer screening       Prostate cancer screening    Estimated body mass index is 30.89 kg/m  as calculated from the following:    Height as of 11/15/19: 1.803 m (5' 11\").    Weight as of 11/15/19: 100.5 kg (221 lb 8 oz).    Weight management plan: Discussed healthy diet and exercise guidelines     reports that he has never smoked. He has never used smokeless tobacco.      Counseling Resources:  ATP IV Guidelines  Pooled Cohorts Equation Calculator  FRAX Risk Assessment  ICSI Preventive Guidelines  Dietary Guidelines for Americans, 2010  USDA's MyPlate  ASA Prophylaxis  Lung CA Screening    Kwasi Ochoa MD  [facility]  "

## 2019-12-03 NOTE — NURSING NOTE
"/72 (BP Location: Right arm, Patient Position: Sitting, Cuff Size: Adult Large)   Pulse 80   Temp 98.1  F (36.7  C) (Oral)   Resp 18   Ht 1.803 m (5' 11\")   Wt 99.7 kg (219 lb 11.2 oz)   SpO2 97%   BMI 30.64 kg/m    Aleena Maritni CMA    "

## 2019-12-04 LAB
ALBUMIN SERPL-MCNC: 4 G/DL (ref 3.4–5)
ALP SERPL-CCNC: 72 U/L (ref 40–150)
ALT SERPL W P-5'-P-CCNC: 39 U/L (ref 0–70)
ANION GAP SERPL CALCULATED.3IONS-SCNC: 7 MMOL/L (ref 3–14)
AST SERPL W P-5'-P-CCNC: 22 U/L (ref 0–45)
BILIRUB SERPL-MCNC: 0.4 MG/DL (ref 0.2–1.3)
BUN SERPL-MCNC: 17 MG/DL (ref 7–30)
CALCIUM SERPL-MCNC: 9.2 MG/DL (ref 8.5–10.1)
CHLORIDE SERPL-SCNC: 106 MMOL/L (ref 94–109)
CHOLEST SERPL-MCNC: 198 MG/DL
CO2 SERPL-SCNC: 26 MMOL/L (ref 20–32)
CREAT SERPL-MCNC: 0.97 MG/DL (ref 0.66–1.25)
GFR SERPL CREATININE-BSD FRML MDRD: >90 ML/MIN/{1.73_M2}
GLUCOSE SERPL-MCNC: 89 MG/DL (ref 70–99)
HDLC SERPL-MCNC: 55 MG/DL
LDLC SERPL CALC-MCNC: 77 MG/DL
NONHDLC SERPL-MCNC: 143 MG/DL
POTASSIUM SERPL-SCNC: 4.1 MMOL/L (ref 3.4–5.3)
PROT SERPL-MCNC: 7.8 G/DL (ref 6.8–8.8)
PSA SERPL-ACNC: 1.45 UG/L (ref 0–4)
SODIUM SERPL-SCNC: 139 MMOL/L (ref 133–144)
TRIGL SERPL-MCNC: 330 MG/DL
TSH SERPL DL<=0.005 MIU/L-ACNC: 1.55 MU/L (ref 0.4–4)

## 2020-11-29 ENCOUNTER — HEALTH MAINTENANCE LETTER (OUTPATIENT)
Age: 45
End: 2020-11-29

## 2021-01-15 ENCOUNTER — HEALTH MAINTENANCE LETTER (OUTPATIENT)
Age: 46
End: 2021-01-15

## 2021-05-28 NOTE — TELEPHONE ENCOUNTER
Triage Nurse Advisor- [facility]    RN Phone Assessment  Pt calling requesting care advice for ear pain, right  Pt states he had a crown on right side 2 months ago, upper molar and was told he might develop pain.  He believes the pain may be related to this.   Root canal is scheduled for Monday.   He states the pain is not being helped with ibuprofen anymore. Pt is taking Ibuprofen every 6 hours, 2 capsules, However, last dose was increased to 3 tabs.   Pain is rated 5-6/10  Pain is constant, ache deep in ear between mouth and ear. Not necessarily jaw but may be related to tooth.  No fever  No redness  Pt would like to know if he should go to an Urgent Care or Call dentist in the morning.     Reviewed Care Advice per Protocol  Encouraged patient to take ibuprofen as directed on the bottle, 2 tabs q 4 hours, to optimize pain control, and to take with food to reduce stomach irritation. Pt agreed. Pt will contact dentist in the morning. He will see a provider if pain worsens or dentist recommends. He also try topical heat and/or cold for comfort measures. Pt agrees with  the recommended plan of care. Has no further questions at this time. Encouraged to call back as needed for worsening or development of additional symptoms as reviewed per protocol.     Tessie Hill, RN, [facility] Nurse Triage    Reason for Disposition    Earache  (Exceptions: brief ear pain of < 60 minutes duration, earache occurring during air travel    Toothache present > 24 hours    Protocols used: EARACHE-A-AH, TOOTHACHE-A-AH

## 2021-09-19 ENCOUNTER — HEALTH MAINTENANCE LETTER (OUTPATIENT)
Age: 46
End: 2021-09-19

## 2022-03-06 ENCOUNTER — HEALTH MAINTENANCE LETTER (OUTPATIENT)
Age: 47
End: 2022-03-06

## 2022-11-21 ENCOUNTER — HEALTH MAINTENANCE LETTER (OUTPATIENT)
Age: 47
End: 2022-11-21

## 2023-04-16 ENCOUNTER — HEALTH MAINTENANCE LETTER (OUTPATIENT)
Age: 48
End: 2023-04-16